# Patient Record
Sex: FEMALE | Race: OTHER | Employment: OTHER | URBAN - METROPOLITAN AREA
[De-identification: names, ages, dates, MRNs, and addresses within clinical notes are randomized per-mention and may not be internally consistent; named-entity substitution may affect disease eponyms.]

---

## 2024-05-30 ENCOUNTER — NEW PATIENT (OUTPATIENT)
Dept: URBAN - METROPOLITAN AREA CLINIC 32 | Facility: CLINIC | Age: 59
End: 2024-05-30

## 2024-05-30 DIAGNOSIS — H52.4: ICD-10-CM

## 2024-05-30 DIAGNOSIS — H52.03: ICD-10-CM

## 2024-05-30 DIAGNOSIS — H52.203: ICD-10-CM

## 2024-05-30 PROCEDURE — 92004 COMPRE OPH EXAM NEW PT 1/>: CPT

## 2024-05-30 PROCEDURE — 92015 DETERMINE REFRACTIVE STATE: CPT

## 2024-05-30 ASSESSMENT — TONOMETRY
OD_IOP_MMHG: 12
OS_IOP_MMHG: 11

## 2024-05-30 ASSESSMENT — VISUAL ACUITY
OS_SC: 20/40
OS_SC: 20/40+2
OD_SC: 20/40
OD_SC: 20/30

## 2025-03-24 ENCOUNTER — EMERGENCY VISIT (OUTPATIENT)
Age: 60
End: 2025-03-24

## 2025-03-24 DIAGNOSIS — H01.112: ICD-10-CM

## 2025-03-24 DIAGNOSIS — H01.115: ICD-10-CM

## 2025-03-24 DIAGNOSIS — H52.4: ICD-10-CM

## 2025-03-24 DIAGNOSIS — H01.111: ICD-10-CM

## 2025-03-24 DIAGNOSIS — H04.123: ICD-10-CM

## 2025-03-24 DIAGNOSIS — H01.114: ICD-10-CM

## 2025-03-24 PROCEDURE — 99214 OFFICE O/P EST MOD 30 MIN: CPT

## 2025-03-24 PROCEDURE — 92015 DETERMINE REFRACTIVE STATE: CPT

## 2025-03-24 RX ORDER — LOTEPREDNOL ETABONATE 3.8 MG/G
1 GEL OPHTHALMIC TWICE A DAY
Start: 2025-03-24 | End: 2025-03-31

## 2025-07-08 ENCOUNTER — APPOINTMENT (OUTPATIENT)
Dept: URBAN - METROPOLITAN AREA CLINIC 126 | Facility: CLINIC | Age: 60
Setting detail: DERMATOLOGY
End: 2025-07-08

## 2025-07-08 DIAGNOSIS — R21 RASH AND OTHER NONSPECIFIC SKIN ERUPTION: ICD-10-CM | Status: RESOLVED

## 2025-07-08 DIAGNOSIS — D22 MELANOCYTIC NEVI: ICD-10-CM

## 2025-07-08 DIAGNOSIS — L81.4 OTHER MELANIN HYPERPIGMENTATION: ICD-10-CM

## 2025-07-08 DIAGNOSIS — L82.1 OTHER SEBORRHEIC KERATOSIS: ICD-10-CM

## 2025-07-08 DIAGNOSIS — Z71.89 OTHER SPECIFIED COUNSELING: ICD-10-CM

## 2025-07-08 PROBLEM — D22.5 MELANOCYTIC NEVI OF TRUNK: Status: ACTIVE | Noted: 2025-07-08

## 2025-07-08 PROCEDURE — ? COSMETIC CONSULTATION: BROWN SPOTS

## 2025-07-08 PROCEDURE — ? SUNSCREEN RECOMMENDATIONS

## 2025-07-08 PROCEDURE — ? COUNSELING

## 2025-07-08 PROCEDURE — ? OTHER

## 2025-07-08 PROCEDURE — ?

## 2025-07-08 PROCEDURE — ? DIAGNOSIS COMMENT

## 2025-07-08 ASSESSMENT — LOCATION SIMPLE DESCRIPTION DERM
LOCATION SIMPLE: RIGHT UPPER BACK
LOCATION SIMPLE: RIGHT CHEEK
LOCATION SIMPLE: LEFT CHEEK
LOCATION SIMPLE: UPPER BACK
LOCATION SIMPLE: CHEST

## 2025-07-08 ASSESSMENT — LOCATION DETAILED DESCRIPTION DERM
LOCATION DETAILED: RIGHT INFERIOR CENTRAL MALAR CHEEK
LOCATION DETAILED: LOWER STERNUM
LOCATION DETAILED: RIGHT INFERIOR MEDIAL UPPER BACK
LOCATION DETAILED: LEFT CENTRAL MALAR CHEEK
LOCATION DETAILED: RIGHT MEDIAL SUPERIOR CHEST
LOCATION DETAILED: INFERIOR THORACIC SPINE
LOCATION DETAILED: MIDDLE STERNUM
LOCATION DETAILED: SUPERIOR THORACIC SPINE

## 2025-07-08 ASSESSMENT — LOCATION ZONE DERM
LOCATION ZONE: FACE
LOCATION ZONE: TRUNK

## 2025-07-08 NOTE — PROCEDURE: COSMETIC CONSULTATION: BROWN SPOTS
Send Procedure Quote As Charge: No
Consultation Charge $ (Use Numbers Only, No Special Characters Or $): 300
Detail Level: Detailed

## 2025-07-08 NOTE — PROCEDURE: DIAGNOSIS COMMENT
Detail Level: Simple
Comment: Urgent care diagnosed it as Scabies, but not definitive
Render Risk Assessment In Note?: no

## 2025-07-08 NOTE — PROCEDURE: OTHER
Detail Level: Zone
Other (Free Text): Rash was resolved from Hydroxyzine, Methylprednisone, Permethrin from Urgent Care. Pt to call if rash returns
Render Risk Assessment In Note?: no
Note Text (......Xxx Chief Complaint.): This diagnosis correlates with the